# Patient Record
Sex: FEMALE | Race: WHITE | Employment: FULL TIME | URBAN - METROPOLITAN AREA
[De-identification: names, ages, dates, MRNs, and addresses within clinical notes are randomized per-mention and may not be internally consistent; named-entity substitution may affect disease eponyms.]

---

## 2019-08-19 PROBLEM — Z83.3 FAMILY HISTORY OF DIABETES MELLITUS (DM): Status: ACTIVE | Noted: 2019-08-19

## 2019-08-19 PROBLEM — F41.8 DEPRESSION WITH ANXIETY: Status: ACTIVE | Noted: 2019-08-19

## 2019-08-19 PROBLEM — E66.813 CLASS 3 SEVERE OBESITY DUE TO EXCESS CALORIES WITH BODY MASS INDEX (BMI) OF 50.0 TO 59.9 IN ADULT: Status: ACTIVE | Noted: 2019-08-19

## 2019-08-19 PROBLEM — E66.01 CLASS 3 SEVERE OBESITY DUE TO EXCESS CALORIES WITH BODY MASS INDEX (BMI) OF 50.0 TO 59.9 IN ADULT (HCC): Status: ACTIVE | Noted: 2019-08-19

## 2019-08-19 PROBLEM — F17.200 SMOKER: Status: ACTIVE | Noted: 2019-08-19

## 2019-09-12 PROBLEM — F32.9 MAJOR DEPRESSION: Status: ACTIVE | Noted: 2019-09-12

## 2019-09-12 PROBLEM — F33.0 MILD EPISODE OF RECURRENT MAJOR DEPRESSIVE DISORDER (HCC): Status: ACTIVE | Noted: 2019-09-12

## 2019-09-12 PROBLEM — F33.0 MILD EPISODE OF RECURRENT MAJOR DEPRESSIVE DISORDER: Status: ACTIVE | Noted: 2019-09-12

## 2019-09-16 PROBLEM — E78.5 DYSLIPIDEMIA: Status: ACTIVE | Noted: 2019-09-16

## 2019-09-16 PROBLEM — F32.9 MAJOR DEPRESSION: Status: ACTIVE | Noted: 2019-09-16

## 2019-09-16 PROBLEM — R73.03 PREDIABETES: Status: ACTIVE | Noted: 2019-09-16

## 2019-09-16 PROCEDURE — 87624 HPV HI-RISK TYP POOLED RSLT: CPT | Performed by: INTERNAL MEDICINE

## 2019-09-16 PROCEDURE — 87591 N.GONORRHOEAE DNA AMP PROB: CPT | Performed by: INTERNAL MEDICINE

## 2019-09-16 PROCEDURE — 88175 CYTOPATH C/V AUTO FLUID REDO: CPT | Performed by: INTERNAL MEDICINE

## 2019-09-16 PROCEDURE — 87491 CHLMYD TRACH DNA AMP PROBE: CPT | Performed by: INTERNAL MEDICINE

## 2020-10-14 PROBLEM — L72.0 EPIDERMAL INCLUSION CYST: Status: ACTIVE | Noted: 2020-10-14

## 2025-05-21 ENCOUNTER — OFFICE VISIT (OUTPATIENT)
Facility: CLINIC | Age: 45
End: 2025-05-21
Payer: MEDICAID

## 2025-05-21 VITALS
HEIGHT: 57 IN | HEART RATE: 84 BPM | BODY MASS INDEX: 54.37 KG/M2 | RESPIRATION RATE: 16 BRPM | OXYGEN SATURATION: 93 % | WEIGHT: 252 LBS

## 2025-05-21 DIAGNOSIS — J43.2 CENTRILOBULAR EMPHYSEMA (HCC): ICD-10-CM

## 2025-05-21 DIAGNOSIS — R06.02 SHORTNESS OF BREATH: Primary | ICD-10-CM

## 2025-05-21 DIAGNOSIS — F17.210 CIGARETTE SMOKER: ICD-10-CM

## 2025-05-21 DIAGNOSIS — R05.3 CHRONIC COUGH: ICD-10-CM

## 2025-05-21 PROCEDURE — 99204 OFFICE O/P NEW MOD 45 MIN: CPT | Performed by: INTERNAL MEDICINE

## 2025-05-21 PROCEDURE — 99407 BEHAV CHNG SMOKING > 10 MIN: CPT | Performed by: INTERNAL MEDICINE

## 2025-05-21 RX ORDER — BLOOD-GLUCOSE METER
1 EACH MISCELLANEOUS AS DIRECTED
COMMUNITY
Start: 2025-04-26

## 2025-05-21 RX ORDER — TIOTROPIUM BROMIDE INHALATION SPRAY 3.12 UG/1
1 SPRAY, METERED RESPIRATORY (INHALATION) DAILY
Qty: 1 EACH | Refills: 5 | Status: SHIPPED | OUTPATIENT
Start: 2025-05-21

## 2025-05-21 RX ORDER — BUSPIRONE HYDROCHLORIDE 15 MG/1
15 TABLET ORAL 3 TIMES DAILY
COMMUNITY
Start: 2025-01-15

## 2025-05-21 RX ORDER — LANCETS
1 EACH MISCELLANEOUS DAILY
COMMUNITY
Start: 2025-04-26

## 2025-05-21 RX ORDER — SACUBITRIL AND VALSARTAN 24; 26 MG/1; MG/1
1 TABLET, FILM COATED ORAL 2 TIMES DAILY
COMMUNITY
Start: 2025-03-29

## 2025-05-21 RX ORDER — APIXABAN 5 MG/1
TABLET, FILM COATED ORAL
COMMUNITY
Start: 2025-05-17

## 2025-05-21 RX ORDER — BUDESONIDE AND FORMOTEROL FUMARATE DIHYDRATE 160; 4.5 UG/1; UG/1
2 AEROSOL RESPIRATORY (INHALATION) 2 TIMES DAILY
Qty: 10.2 G | Refills: 5 | Status: SHIPPED | OUTPATIENT
Start: 2025-05-21

## 2025-05-21 RX ORDER — ALBUTEROL SULFATE 90 UG/1
2 INHALANT RESPIRATORY (INHALATION) EVERY 6 HOURS PRN
COMMUNITY
Start: 2025-01-29

## 2025-05-21 RX ORDER — DAPAGLIFLOZIN 10 MG/1
10 TABLET, FILM COATED ORAL DAILY
COMMUNITY
Start: 2025-03-29

## 2025-05-21 RX ORDER — LAMOTRIGINE 25 MG/1
1 TABLET ORAL 2 TIMES DAILY
COMMUNITY
Start: 2025-01-15

## 2025-05-21 RX ORDER — ACETAZOLAMIDE 250 MG/1
250 TABLET ORAL 2 TIMES DAILY
COMMUNITY
Start: 2025-03-29

## 2025-05-21 RX ORDER — QUETIAPINE FUMARATE 25 MG/1
25 TABLET, FILM COATED ORAL 2 TIMES DAILY
COMMUNITY
Start: 2025-03-24

## 2025-05-21 RX ORDER — METOPROLOL SUCCINATE 50 MG/1
50 TABLET, EXTENDED RELEASE ORAL DAILY
COMMUNITY
Start: 2025-05-09

## 2025-05-21 NOTE — PROGRESS NOTES
[facility] General Pulmonary Consult Note    Chief Complaint:  Chief Complaint   Patient presents with    New Patient     Pt was seen at [facility] and was told she had fluid in her lungs        History of Present Illness:  History of Present Illness  Fior Howard is a 44 year old female with heart failure who presents for a hospital follow-up.    She was recently hospitalized for heart failure, during which 42 pounds of fluid were removed. She is currently on oxygen therapy, required only at night. Since the hospitalization, there has been an improvement in her ability to perform activities without becoming short of breath, although excessive exertion still causes some dyspnea. She can now walk from her home to her car without needing to take breaks, which was not possible before the fluid removal.    She has a significant smoking history, having smoked regularly since the age of 16, and is currently attempting to quit smoking. She works as an  for a [employer] company, where she has access to an oxygen machine if needed. Her boss allows her flexibility in her work schedule to accommodate her health needs.    No significant cough, although she occasionally coughs up phlegm, which is not bloody or discolored. No other respiratory symptoms reported.      Past Medical History:   Past Medical History[1]     Past Surgical History: Past Surgical History[2]    Family Medical History: Family History[3]     Social History:   Social History     Socioeconomic History    Marital status: Single     Spouse name: Not on file    Number of children: Not on file    Years of education: Not on file    Highest education level: Not on file   Occupational History    Not on file   Tobacco Use    Smoking status: Every Day     Current packs/day: 0.25     Average packs/day: 1 pack/day for 29.4 years (29.3 ttl pk-yrs)     Types: Cigarettes     Start date: 1996     Passive exposure: Current    Smokeless tobacco: Never   Vaping Use     Vaping status: Never Used   Substance and Sexual Activity    Alcohol use: Yes    Drug use: Yes     Types: Cannabis     Comment: about once a month    Sexual activity: Yes     Partners: Male   Other Topics Concern    Not on file   Social History Narrative    Not on file     Social Drivers of Health     Food Insecurity: Not at Risk (2025)    Received from [email or web address]    Food Insecurity     Food: 1   Transportation Needs: Not at Risk (2025)    Received from [email or web address]    Transportation Needs     Transportation: 1   Stress: Not on file   Housing Stability: Not at Risk (2025)    Received from [email or web address]    Housing Stability     Housin        Allergies: Patient has no known allergies.     Medications: Current Medications[4]    Review of Systems: Review of Systems    Physical Exam:  Pulse 84   Resp 16   Ht 4' 9\" (1.448 m)   Wt 252 lb (114.3 kg)   SpO2 93%   BMI 54.53 kg/m²      Constitutional: alert, cooperative. No acute distress.  HEENT: Head NC/AT. Nares normal. Septum midline. Mucosa normal. No drainage or sinus tenderness.. Mallampati 3+  Cardio: Regular rate and rhythm. Normal S1 and S2. No murmurs.   Respiratory: Thorax symmetrical with no labored breathing. clear to auscultation bilaterally  GI: NABS. Abd soft, non-tender.  Extremities: No clubbing or cyanosis. No BLE edema.    Neurologic: A&Ox3. No gross motor deficits.  Skin: Warm, dry  Psych: Calm, cooperative. Pleasant affect.    Results:  Images personally reviewed - my own review dictated as below  Results    WBC: 8.13, done on 2019.  HGB: 13.4, done on 2019.  PLT: 291, done on 2019.     CA: 8.8, done on 2022.  Cl: 105, done on 2022.  CO2: 26.6, done on 2022.     No results found.     Assessment/Plan:  Assessment & Plan  Heart failure  Heart failure with fluid retention, 42 pounds removed during hospitalization. Improved exercise tolerance, dyspnea with exertion.  - Continue nighttime supplemental oxygen.    COPD  Chronic  obstructive pulmonary disease likely due to smoking. Dyspnea after 100 feet. Differential includes COPD or other lung pathology. Discussed inhaler use and need for chest imaging.  - Order pulmonary function tests.  - Prescribe Symbicort inhaler twice daily.  - Prescribe Spiriva inhaler once daily.  - Order chest imaging for lung cancer screening.        No follow-ups on file.    Sharri Beaulieu MD  2025  Tobacco cessation counseling For >10 minutes (add E/M code #11244).       [1]   Past Medical History:   Bipolar affective disorder (HCC)   [2]   Past Surgical History:  Procedure Laterality Date      2007 and 2007    Tonsillectomy     [3]   Family History  Problem Relation Age of Onset    Heart Disorder Father     Diabetes Mother     Depression Mother     Diabetes Maternal Grandmother    [4]   Current Outpatient Medications   Medication Sig Dispense Refill    acetaZOLAMIDE 250 MG Oral Tab Take 1 tablet (250 mg total) by mouth 2 (two) times daily.      albuterol 108 (90 Base) MCG/ACT Inhalation Aero Soln Inhale 2 puffs into the lungs every 6 (six) hours as needed for Wheezing or Shortness of Breath.      ELIQUIS 5 MG Oral Tab       Blood Glucose Monitoring Suppl (CONTOUR PLUS BLUE) w/Device Does not apply Kit 1 Device As Directed.      busPIRone 15 MG Oral Tab Take 1 tablet (15 mg total) by mouth 3 (three) times daily.      dapagliflozin 10 MG Oral Tab Take 1 tablet (10 mg total) by mouth daily.      lamoTRIgine 25 MG Oral Tab Take 1 tablet (25 mg total) by mouth 2 (two) times daily.      Microlet Lancets Does not apply Misc 1 strip by In Vitro route daily.      metoprolol succinate ER 50 MG Oral Tablet 24 Hr Take 1 tablet (50 mg total) by mouth daily.      QUEtiapine 25 MG Oral Tab Take 1 tablet (25 mg total) by mouth 2 (two) times daily.      ENTRESTO 24-26 MG Oral Tab Take 1 tablet by mouth 2 (two) times daily.      Budesonide-Formoterol Fumarate (SYMBICORT) 160-4.5 MCG/ACT  Inhalation Aerosol Inhale 2 puffs into the lungs 2 (two) times daily. 10.2 g 5    Tiotropium Bromide Monohydrate (SPIRIVA RESPIMAT) 2.5 MCG/ACT Inhalation Aero Soln Inhale 1 puff into the lungs daily. 1 each 5    ALPRAZolam 0.25 MG Oral Tab Take 1 tablet (0.25 mg total) by mouth 2 (two) times daily. Needs appointment prior to any additional medication refill 60 tablet 0    buPROPion HCl ER, XL, 450 MG Oral Tablet 24 Hr Take 450 mg by mouth daily. 30 tablet 3    Phentermine HCl 37.5 MG Oral Cap Take 1 capsule (37.5 mg total) by mouth every morning. Needs appointment prior to any additional medication refill (Patient not taking: Reported on 5/21/2025) 30 capsule 0    Phentermine HCl 37.5 MG Oral Tab Take 1 tablet (37.5 mg total) by mouth before breakfast. (Patient not taking: Reported on 5/21/2025) 30 tablet 0

## 2025-05-21 NOTE — PROGRESS NOTES
The following individual(s) verbally consented to be recorded using ambient AI listening technology and understand that they can each withdraw their consent to this listening technology at any point by asking the clinician to turn off or pause the recording:    Patient name: Fior Howard  Additional names:  [email or web address] - daughter

## 2025-05-21 NOTE — PATIENT INSTRUCTIONS
Quitting Smoking    Quitting smoking is the most important step you can take to improve your health. We're glad you have set a goal to improve your health.    Quit Smoking Resources    In addition to medications, use the STAR plan to help you successfully quit.   Stick with your quit date!   Tell friends, family, and coworkers your quit date. Request their understanding and support.  Anticipate and prepare for challenges. Some examples are withdrawal symptoms, being around others who smoke, and drinking alcohol.  Remove all tobacco products and paraphernalia from your environment. Make your home and vehicles smoke-free.    Free resources for additional support:  National tobacco quitline: 1-800-QUIT-NOW (1-232.273.3047).  SmokefreeTXT is a free text program to assist you in quitting. Visit https://www.smokefree.gov/smokefreetxt for more information.  Feel free to call your care manager at ([phone]) for additional support.

## 2025-07-18 ENCOUNTER — MED REC SCAN ONLY (OUTPATIENT)
Facility: CLINIC | Age: 45
End: 2025-07-18

## 2025-08-04 ENCOUNTER — HOSPITAL ENCOUNTER (OUTPATIENT)
Dept: CT IMAGING | Age: 45
Discharge: HOME | End: 2025-08-04
Attending: INTERNAL MEDICINE

## 2025-08-04 DIAGNOSIS — R05.3 CHRONIC COUGH: ICD-10-CM

## 2025-08-04 PROCEDURE — 71250 CT THORAX DX C-: CPT | Performed by: INTERNAL MEDICINE

## 2025-08-13 ENCOUNTER — OFFICE VISIT (OUTPATIENT)
Facility: CLINIC | Age: 45
End: 2025-08-13

## 2025-08-13 VITALS
DIASTOLIC BLOOD PRESSURE: 52 MMHG | HEART RATE: 86 BPM | HEIGHT: 57 IN | SYSTOLIC BLOOD PRESSURE: 100 MMHG | OXYGEN SATURATION: 93 % | WEIGHT: 252 LBS | BODY MASS INDEX: 54.37 KG/M2 | RESPIRATION RATE: 16 BRPM

## 2025-08-13 DIAGNOSIS — R06.02 SHORTNESS OF BREATH: Primary | ICD-10-CM

## 2025-08-13 DIAGNOSIS — J43.2 CENTRILOBULAR EMPHYSEMA (HCC): ICD-10-CM

## 2025-08-13 PROCEDURE — 99214 OFFICE O/P EST MOD 30 MIN: CPT | Performed by: INTERNAL MEDICINE

## 2025-08-13 RX ORDER — SEMAGLUTIDE 0.68 MG/ML
INJECTION, SOLUTION SUBCUTANEOUS
COMMUNITY

## 2025-08-13 RX ORDER — EMPAGLIFLOZIN 10 MG/1
TABLET, FILM COATED ORAL
COMMUNITY
Start: 2025-06-06

## 2025-08-13 RX ORDER — BLOOD SUGAR DIAGNOSTIC
STRIP MISCELLANEOUS
COMMUNITY

## 2025-08-13 RX ORDER — LANCING DEVICE
EACH MISCELLANEOUS
COMMUNITY
Start: 2025-06-20